# Patient Record
Sex: MALE | URBAN - METROPOLITAN AREA
[De-identification: names, ages, dates, MRNs, and addresses within clinical notes are randomized per-mention and may not be internally consistent; named-entity substitution may affect disease eponyms.]

---

## 2018-05-23 VITALS
DIASTOLIC BLOOD PRESSURE: 58 MMHG | HEART RATE: 76 BPM | SYSTOLIC BLOOD PRESSURE: 125 MMHG | WEIGHT: 142.2 LBS | OXYGEN SATURATION: 100 % | HEIGHT: 70 IN | TEMPERATURE: 97 F | RESPIRATION RATE: 18 BRPM

## 2018-05-24 ENCOUNTER — INPATIENT (INPATIENT)
Facility: HOSPITAL | Age: 23
LOS: 0 days | Discharge: HOME | DRG: 132 | End: 2018-05-25
Attending: DENTIST | Admitting: DENTIST
Payer: COMMERCIAL

## 2018-05-24 LAB
HCT VFR BLD CALC: 38.9 % — LOW (ref 39–50)
HGB BLD-MCNC: 13.2 G/DL — SIGNIFICANT CHANGE UP (ref 13–17)
MCHC RBC-ENTMCNC: 28.8 PG — SIGNIFICANT CHANGE UP (ref 27–34)
MCHC RBC-ENTMCNC: 33.9 G/DL — SIGNIFICANT CHANGE UP (ref 32–36)
MCV RBC AUTO: 84.7 FL — SIGNIFICANT CHANGE UP (ref 80–100)
PLATELET # BLD AUTO: 249 K/UL — SIGNIFICANT CHANGE UP (ref 150–400)
RBC # BLD: 4.59 M/UL — SIGNIFICANT CHANGE UP (ref 4.2–5.8)
RBC # FLD: 12.7 % — SIGNIFICANT CHANGE UP (ref 10.3–16.9)
WBC # BLD: 13.3 K/UL — HIGH (ref 3.8–10.5)
WBC # FLD AUTO: 13.3 K/UL — HIGH (ref 3.8–10.5)

## 2018-05-24 RX ORDER — ONDANSETRON 8 MG/1
4 TABLET, FILM COATED ORAL EVERY 4 HOURS
Qty: 0 | Refills: 0 | Status: DISCONTINUED | OUTPATIENT
Start: 2018-05-24 | End: 2018-05-25

## 2018-05-24 RX ORDER — FAMOTIDINE 10 MG/ML
20 INJECTION INTRAVENOUS
Qty: 0 | Refills: 0 | Status: DISCONTINUED | OUTPATIENT
Start: 2018-05-25 | End: 2018-05-25

## 2018-05-24 RX ORDER — DEXAMETHASONE 0.5 MG/5ML
8 ELIXIR ORAL ONCE
Qty: 0 | Refills: 0 | Status: COMPLETED | OUTPATIENT
Start: 2018-05-24 | End: 2018-05-24

## 2018-05-24 RX ORDER — KETOROLAC TROMETHAMINE 30 MG/ML
15 SYRINGE (ML) INJECTION EVERY 6 HOURS
Qty: 0 | Refills: 0 | Status: DISCONTINUED | OUTPATIENT
Start: 2018-05-24 | End: 2018-05-25

## 2018-05-24 RX ORDER — ALBUTEROL 90 UG/1
1 AEROSOL, METERED ORAL EVERY 4 HOURS
Qty: 0 | Refills: 0 | Status: DISCONTINUED | OUTPATIENT
Start: 2018-05-24 | End: 2018-05-25

## 2018-05-24 RX ORDER — ACETAMINOPHEN 500 MG
1000 TABLET ORAL ONCE
Qty: 0 | Refills: 0 | Status: COMPLETED | OUTPATIENT
Start: 2018-05-24 | End: 2018-05-24

## 2018-05-24 RX ORDER — ALBUTEROL 90 UG/1
2.5 AEROSOL, METERED ORAL EVERY 6 HOURS
Qty: 0 | Refills: 0 | Status: DISCONTINUED | OUTPATIENT
Start: 2018-05-24 | End: 2018-05-25

## 2018-05-24 RX ORDER — HYDROCORTISONE 1 %
1 OINTMENT (GRAM) TOPICAL EVERY 4 HOURS
Qty: 0 | Refills: 0 | Status: DISCONTINUED | OUTPATIENT
Start: 2018-05-24 | End: 2018-05-25

## 2018-05-24 RX ORDER — DEXAMETHASONE 0.5 MG/5ML
6 ELIXIR ORAL EVERY 8 HOURS
Qty: 0 | Refills: 0 | Status: COMPLETED | OUTPATIENT
Start: 2018-05-24 | End: 2018-05-25

## 2018-05-24 RX ORDER — OXYMETAZOLINE HYDROCHLORIDE 0.5 MG/ML
2 SPRAY NASAL
Qty: 0 | Refills: 0 | Status: DISCONTINUED | OUTPATIENT
Start: 2018-05-25 | End: 2018-05-25

## 2018-05-24 RX ORDER — METOCLOPRAMIDE HCL 10 MG
10 TABLET ORAL EVERY 6 HOURS
Qty: 0 | Refills: 0 | Status: DISCONTINUED | OUTPATIENT
Start: 2018-05-24 | End: 2018-05-25

## 2018-05-24 RX ORDER — SODIUM CHLORIDE 0.65 %
1 AEROSOL, SPRAY (ML) NASAL
Qty: 0 | Refills: 0 | Status: DISCONTINUED | OUTPATIENT
Start: 2018-05-24 | End: 2018-05-25

## 2018-05-24 RX ORDER — MORPHINE SULFATE 50 MG/1
2 CAPSULE, EXTENDED RELEASE ORAL EVERY 4 HOURS
Qty: 0 | Refills: 0 | Status: DISCONTINUED | OUTPATIENT
Start: 2018-05-24 | End: 2018-05-25

## 2018-05-24 RX ORDER — SODIUM CHLORIDE 9 MG/ML
1000 INJECTION, SOLUTION INTRAVENOUS
Qty: 0 | Refills: 0 | Status: DISCONTINUED | OUTPATIENT
Start: 2018-05-24 | End: 2018-05-25

## 2018-05-24 RX ORDER — MORPHINE SULFATE 50 MG/1
2 CAPSULE, EXTENDED RELEASE ORAL ONCE
Qty: 0 | Refills: 0 | Status: DISCONTINUED | OUTPATIENT
Start: 2018-05-24 | End: 2018-05-24

## 2018-05-24 RX ORDER — AMPICILLIN SODIUM AND SULBACTAM SODIUM 250; 125 MG/ML; MG/ML
1.5 INJECTION, POWDER, FOR SUSPENSION INTRAMUSCULAR; INTRAVENOUS EVERY 6 HOURS
Qty: 0 | Refills: 0 | Status: DISCONTINUED | OUTPATIENT
Start: 2018-05-24 | End: 2018-05-25

## 2018-05-24 RX ADMIN — MORPHINE SULFATE 2 MILLIGRAM(S): 50 CAPSULE, EXTENDED RELEASE ORAL at 11:41

## 2018-05-24 RX ADMIN — MORPHINE SULFATE 2 MILLIGRAM(S): 50 CAPSULE, EXTENDED RELEASE ORAL at 11:14

## 2018-05-24 RX ADMIN — Medication 15 MILLIGRAM(S): at 17:36

## 2018-05-24 RX ADMIN — ONDANSETRON 4 MILLIGRAM(S): 8 TABLET, FILM COATED ORAL at 23:14

## 2018-05-24 RX ADMIN — AMPICILLIN SODIUM AND SULBACTAM SODIUM 100 GRAM(S): 250; 125 INJECTION, POWDER, FOR SUSPENSION INTRAMUSCULAR; INTRAVENOUS at 17:36

## 2018-05-24 RX ADMIN — Medication 15 MILLIGRAM(S): at 12:57

## 2018-05-24 RX ADMIN — MORPHINE SULFATE 2 MILLIGRAM(S): 50 CAPSULE, EXTENDED RELEASE ORAL at 17:06

## 2018-05-24 RX ADMIN — MORPHINE SULFATE 2 MILLIGRAM(S): 50 CAPSULE, EXTENDED RELEASE ORAL at 13:49

## 2018-05-24 RX ADMIN — AMPICILLIN SODIUM AND SULBACTAM SODIUM 100 GRAM(S): 250; 125 INJECTION, POWDER, FOR SUSPENSION INTRAMUSCULAR; INTRAVENOUS at 12:55

## 2018-05-24 RX ADMIN — Medication 15 MILLIGRAM(S): at 17:51

## 2018-05-24 RX ADMIN — MORPHINE SULFATE 2 MILLIGRAM(S): 50 CAPSULE, EXTENDED RELEASE ORAL at 23:30

## 2018-05-24 RX ADMIN — ONDANSETRON 4 MILLIGRAM(S): 8 TABLET, FILM COATED ORAL at 16:42

## 2018-05-24 RX ADMIN — MORPHINE SULFATE 2 MILLIGRAM(S): 50 CAPSULE, EXTENDED RELEASE ORAL at 23:14

## 2018-05-24 RX ADMIN — Medication 6 MILLIGRAM(S): at 16:43

## 2018-05-24 RX ADMIN — MORPHINE SULFATE 2 MILLIGRAM(S): 50 CAPSULE, EXTENDED RELEASE ORAL at 13:29

## 2018-05-24 RX ADMIN — MORPHINE SULFATE 2 MILLIGRAM(S): 50 CAPSULE, EXTENDED RELEASE ORAL at 16:51

## 2018-05-24 RX ADMIN — Medication 1 APPLICATION(S): at 21:20

## 2018-05-24 RX ADMIN — Medication 3048 MILLIGRAM(S): at 21:18

## 2018-05-24 RX ADMIN — Medication 400 MILLIGRAM(S): at 15:24

## 2018-05-25 VITALS
HEART RATE: 79 BPM | SYSTOLIC BLOOD PRESSURE: 102 MMHG | TEMPERATURE: 97 F | OXYGEN SATURATION: 100 % | RESPIRATION RATE: 17 BRPM | DIASTOLIC BLOOD PRESSURE: 64 MMHG

## 2018-05-25 PROCEDURE — C1889: CPT

## 2018-05-25 PROCEDURE — 86901 BLOOD TYPING SEROLOGIC RH(D): CPT

## 2018-05-25 PROCEDURE — 36415 COLL VENOUS BLD VENIPUNCTURE: CPT

## 2018-05-25 PROCEDURE — 86900 BLOOD TYPING SEROLOGIC ABO: CPT

## 2018-05-25 PROCEDURE — C1713: CPT

## 2018-05-25 PROCEDURE — 85027 COMPLETE CBC AUTOMATED: CPT

## 2018-05-25 PROCEDURE — 86850 RBC ANTIBODY SCREEN: CPT

## 2018-05-25 PROCEDURE — C9399: CPT

## 2018-05-25 RX ORDER — IBUPROFEN 200 MG
600 TABLET ORAL EVERY 6 HOURS
Qty: 0 | Refills: 0 | Status: DISCONTINUED | OUTPATIENT
Start: 2018-05-25 | End: 2018-05-25

## 2018-05-25 RX ORDER — OXYCODONE HYDROCHLORIDE 5 MG/1
5 TABLET ORAL EVERY 4 HOURS
Qty: 0 | Refills: 0 | Status: DISCONTINUED | OUTPATIENT
Start: 2018-05-25 | End: 2018-05-25

## 2018-05-25 RX ORDER — ACETAMINOPHEN 500 MG
650 TABLET ORAL EVERY 6 HOURS
Qty: 0 | Refills: 0 | Status: DISCONTINUED | OUTPATIENT
Start: 2018-05-25 | End: 2018-05-25

## 2018-05-25 RX ADMIN — Medication 6 MILLIGRAM(S): at 07:08

## 2018-05-25 RX ADMIN — Medication 6 MILLIGRAM(S): at 00:37

## 2018-05-25 RX ADMIN — MORPHINE SULFATE 2 MILLIGRAM(S): 50 CAPSULE, EXTENDED RELEASE ORAL at 03:25

## 2018-05-25 RX ADMIN — AMPICILLIN SODIUM AND SULBACTAM SODIUM 100 GRAM(S): 250; 125 INJECTION, POWDER, FOR SUSPENSION INTRAMUSCULAR; INTRAVENOUS at 00:37

## 2018-05-25 RX ADMIN — Medication 15 MILLIGRAM(S): at 05:49

## 2018-05-25 RX ADMIN — Medication 15 MILLIGRAM(S): at 06:00

## 2018-05-25 RX ADMIN — AMPICILLIN SODIUM AND SULBACTAM SODIUM 100 GRAM(S): 250; 125 INJECTION, POWDER, FOR SUSPENSION INTRAMUSCULAR; INTRAVENOUS at 11:03

## 2018-05-25 RX ADMIN — Medication 15 MILLIGRAM(S): at 11:04

## 2018-05-25 RX ADMIN — AMPICILLIN SODIUM AND SULBACTAM SODIUM 100 GRAM(S): 250; 125 INJECTION, POWDER, FOR SUSPENSION INTRAMUSCULAR; INTRAVENOUS at 05:49

## 2018-05-25 RX ADMIN — ONDANSETRON 4 MILLIGRAM(S): 8 TABLET, FILM COATED ORAL at 03:08

## 2018-05-25 RX ADMIN — Medication 15 MILLIGRAM(S): at 11:03

## 2018-05-25 RX ADMIN — OXYCODONE HYDROCHLORIDE 5 MILLIGRAM(S): 5 TABLET ORAL at 14:48

## 2018-05-25 RX ADMIN — MORPHINE SULFATE 2 MILLIGRAM(S): 50 CAPSULE, EXTENDED RELEASE ORAL at 03:09

## 2018-05-25 RX ADMIN — OXYCODONE HYDROCHLORIDE 5 MILLIGRAM(S): 5 TABLET ORAL at 15:48

## 2018-05-25 RX ADMIN — Medication 15 MILLIGRAM(S): at 00:37

## 2018-05-25 RX ADMIN — Medication 15 MILLIGRAM(S): at 00:50

## 2018-05-25 NOTE — DISCHARGE NOTE ADULT - CARE PLAN
Principal Discharge DX:	Dentofacial anomalies, including malocclusion  Goal:	Correction of skeletal dentofacial deformity  Assessment and plan of treatment:	S/p Lefort I osteotomy and bilateral intraoral vertical ramus osteotomies  Secondary Diagnosis:	Maxillary hypoplasia

## 2018-05-25 NOTE — PROGRESS NOTE ADULT - SUBJECTIVE AND OBJECTIVE BOX
OMFS POST-OP NOTE:    21 y/o M who is POD#0 s/p Lefort I osteotomy and bilateral mandibular sagittal split osteotomies in the OR under GETA.  The procedure was uncomplicated from surgical and anesthesia perspectives. The patient was smoothly extubated in the OR and recovered in the PACU uneventfully until stable for transfer to the floor.     The patient was then brought to the floor where he has so far convalesced appropriately without any issues to date.    Interim:  Vitals WNL  No issues per nursing or patient    Exam:  Vitals: AF, VSS  Gen: lying in bed, AOx3, drowsy but in NAD  Neuro: No V2 or V3 paresthesia bilaterally  HEENT: Bilateral mid and lower facial swelling c/w surgery, mild tenderness to palpation throughout, NGT secured with tape, lips are edematous; intraorally pt is in MMF w/ heavy elastics, occlusion is class I, incision sites hemostatic w/ sutures in place, gingiva is warm, pink, and well perfused, dental midlines are coincident and aligned w/ facial midline  CV: RRR, normal s1 and s2  Pulm: CTAB, no wheezing  Abd: NT/ND  Extr: WWP, DPs 2+     Assessment & Plan:   21 y/o M who is POD#0 s/p Lefort I osteotomy and bilateral mandibular sagittal split osteotomies who is progressing well per surgical course.     #Neuro  -pain management with  	- IV toradol 15mg q6h standing  	- Tylenol 1000mg IV q6h prn moderate pain  	- Morphine PCA    #HEENT  - HOB elevated 30 degrees  - Ice to face   - NGT to low intermittent suction  - Oral suction PRN  - Afrin 2 sprays to each nostril BID for 3 days  - Saline nasal spray 2 sprays to each nostril q4h PRN congestion  - Hydrocortisone cream to lips TID  - Scissors at bedside    #CV  - Monitor hemodynamics    #Pulm: exercised-induced asthma  - Albuterol nebulizer treatment q6h prn   - OOB as tolerated    #FENGI  - LR @ 100cc/hr  - NPO  - Decadron    - Pepcid 20mg IVPB q12h  - Zofran 4mg q6h prn nausea/vomiting  - Reglan 10mg q6h prn 2nd line nausea/vomiting    #ID  - Unasyn 1.5mg q6h    #PPX  - SCDs  - OOB    #LINES  - NGT to low intermittent suction  - Quiñones D/C @ 12AM with trial of void      Case discussed with chief and attending.     Kirit Baires DDS, MD
OMFS Progress Note:    23 y/o M who is POD#1 s/p Lefort I osteotomy and bilateral mandibular sagittal split osteotomies in the OR under GETA.     Interim:  AUGUSTUS Quiñones dc'ed o/n, passed TOV  NGT dc'ed this AM.     Exam:  Vitals: AF, VSS  Gen: lying in bed, AOx3, drowsy but in NAD  Neuro: Bilateral V2 hypoesthesia (L > R)  HEENT: Bilateral mid and lower facial swelling c/w surgery, mild tenderness to palpation throughout, NGT secured with tape, lips are edematous; intraorally pt is in MMF w/ heavy elastics, occlusion is class slightly Class III, incision sites hemostatic w/ sutures in place, gingiva is warm, pink, and well perfused, dental midlines are coincident and aligned w/ facial midline    Labs:   Hct 38    Assessment & Plan:   23 y/o M who is POD#1 s/p Lefort I osteotomy and bilateral mandibular sagittal split osteotomies in the OR under GETA.  The procedure was uncomplicated from surgical and anesthesia perspectives.     Converted to PO analgesics. Dc'ed IVF.  Tolerating PO.  Ambulating, passed TOV.  Cleared for discharge to home later today. Will f/u w/ Dr. Farrell in clinic 5/29/18.      Case discussed with chief and attending.

## 2018-05-25 NOTE — DISCHARGE NOTE ADULT - PLAN OF CARE
Correction of skeletal dentofacial deformity S/p Lefort I osteotomy and bilateral intraoral vertical ramus osteotomies

## 2018-05-25 NOTE — DISCHARGE NOTE ADULT - REASON FOR ADMISSION
[place] I osteotomy and bilateral intraoral vertical ramus osteotomies to correct skeletal dentofacial deformity

## 2018-05-25 NOTE — DISCHARGE NOTE ADULT - HOSPITAL COURSE
Pt was admitted 5/24/18 for planned elective [place] I osteomy and bilateral intraoral vertical ramus osteotomies to correct skeletal dentofacial deformity.  The case was uncomplicated from surgical and anesthesia perspectives.  He was extubated and recovered uneventfully in PACU.  After which he was transferred to the floor where he appropriately convalesced.  Transitioned to PO medications in the AM of 5/25/18.  Tolerating PO after NGT dc'ed.  Pain well controlled.  Ambulating and voiding.  Cleared for discharge to home w/ f/u next week.

## 2018-05-25 NOTE — DISCHARGE NOTE ADULT - CARE PROVIDER_API CALL
Pastor Farrell (DMD), OralMaxillofacial Surgery  [address] [place] [address]  Phone: [phone]  Fax: [phone]

## 2018-05-25 NOTE — DISCHARGE NOTE ADULT - PATIENT PORTAL LINK FT
You can access the [facility] Patient Portal, offered by [facility], by registering with the following website: [email or web address]

## 2018-05-30 DIAGNOSIS — M26.213 MALOCCLUSION, ANGLE'S CLASS III: ICD-10-CM

## 2018-05-30 DIAGNOSIS — M26.50 DENTOFACIAL FUNCTIONAL ABNORMALITIES, UNSPECIFIED: ICD-10-CM

## 2018-05-30 DIAGNOSIS — J45.990 EXERCISE INDUCED BRONCHOSPASM: ICD-10-CM

## 2018-05-30 DIAGNOSIS — M26.02 MAXILLARY HYPOPLASIA: ICD-10-CM

## 2020-07-24 NOTE — ASU PREOP CHECKLIST - RESPIRATORY RATE (BREATHS/MIN)
From: [place] Leach Deal  To: Stan Ayoub DO  Sent: 7/24/2020 9:23 AM EDT  Subject: Non-Urgent Medical Question    Is it possible to get something prescribed for anxiety? In the past you have given me hydroxyzine and I have taken buspar, neither helped with anxiety much at all. But lately I have been having anxiety attacks a lot to the point where I do not even want to leave my house and im not sure what triggered it. But please advise. I would like for these to stop and be able to be calm. Thanks! 18

## 2025-02-17 NOTE — PATIENT PROFILE ADULT. - FUNCTIONAL SCREEN CURRENT LEVEL: COMMUNICATION, MLM
Dr. Gasca has been out of office. Will resend message regarding sedation review request.     Annemarie Manzo RN  Endoscopy Procedure Pre Assessment   [phone] option 3       (0) understands/communicates without difficulty